# Patient Record
Sex: FEMALE | Race: WHITE | Employment: FULL TIME | URBAN - METROPOLITAN AREA
[De-identification: names, ages, dates, MRNs, and addresses within clinical notes are randomized per-mention and may not be internally consistent; named-entity substitution may affect disease eponyms.]

---

## 2020-12-15 ENCOUNTER — MEDICAL CORRESPONDENCE (OUTPATIENT)
Dept: HEALTH INFORMATION MANAGEMENT | Facility: CLINIC | Age: 60
End: 2020-12-15

## 2020-12-15 DIAGNOSIS — M54.12 CERVICAL RADICULOPATHY: Primary | ICD-10-CM

## 2020-12-17 ENCOUNTER — TELEPHONE (OUTPATIENT)
Dept: NEUROSURGERY | Facility: CLINIC | Age: 60
End: 2020-12-17

## 2020-12-17 NOTE — TELEPHONE ENCOUNTER
[facility] Call Center    Phone Message    May a detailed message be left on voicemail: yes     Reason for Call: Other: Pt calling to schedule appt with Dr. Martin for Cervical radiculopathy as referred by Lorrie Bergman at [place]. The referral indicates Pt is to be seen within 1-2 week. She advised that she has an EMG scheduled at [place] on 1/19/21 and her doctor thought she should have results from EMG prior to appt with Dr. Martin. Please review and call Pt back to advise ans schedule.    Action Taken: Message routed to:  [facility] ([facility] Neurosurgery    Travel Screening: Not Applicable

## 2020-12-21 NOTE — TELEPHONE ENCOUNTER
SPINE PATIENTS - NEW PROTOCOL PREVISIT    RECORDS RECEIVED FROM: External   Date of Appt: 1/21/21   NOTES (FOR ALL VISITS) STATUS DETAILS   OFFICE NOTE from referring provider Care Everywhere Dr Lorrie Bergman @ [facility]:  12/15/20   OFFICE NOTE from other specialist Care Everywhere Dr Austen Pollack @ [facility]:  10/29/20   DISCHARGE SUMMARY from hospital N/A    DISCHARGE REPORT from ER N/A    EMG REPORT [facility]:  1/19/21   MEDICATION LIST Care Everywhere    IMAGING  (FOR ALL VISITS)     MRI (HEAD, NECK, SPINE) Received [facility]:  MRI Cervical Spine 10/16/20   XRAY (SPINE) *NEUROSURGERY* Received [facility]:  XR Cervical Spine 10/29/20   CT (HEAD, NECK, SPINE) N/A       Action 12/21/20 MV 1.30pm   Action Taken Imaging request faxed to [facility] for:  MRI Cervical Spine 10/16/20  XR Cervical Spine 10/29/20     Imaging Received  1/8/21 MV 9.12am  [facility]   Image Type (x): Disc:   PACS: x   Exam Date/Name MRI Cervical Spine 10/16/20  XR Cervical Spine 10/29/20 Comments: images resolved in PACS

## 2021-01-05 ENCOUNTER — DOCUMENTATION ONLY (OUTPATIENT)
Dept: CARE COORDINATION | Facility: CLINIC | Age: 61
End: 2021-01-05

## 2021-01-21 ENCOUNTER — PRE VISIT (OUTPATIENT)
Dept: NEUROSURGERY | Facility: CLINIC | Age: 61
End: 2021-01-21

## 2021-01-21 ENCOUNTER — VIRTUAL VISIT (OUTPATIENT)
Dept: NEUROSURGERY | Facility: CLINIC | Age: 61
End: 2021-01-21
Payer: COMMERCIAL

## 2021-01-21 DIAGNOSIS — R42 DIZZINESS AFTER EXTENSION OF NECK: Primary | ICD-10-CM

## 2021-01-21 PROCEDURE — 99204 OFFICE O/P NEW MOD 45 MIN: CPT | Mod: 95 | Performed by: NEUROLOGICAL SURGERY

## 2021-01-21 RX ORDER — CHLORAL HYDRATE 500 MG
1000 CAPSULE ORAL
COMMUNITY

## 2021-01-21 RX ORDER — TIZANIDINE 2 MG/1
2 TABLET ORAL
COMMUNITY
Start: 2020-10-09 | End: 2021-10-14

## 2021-01-21 RX ORDER — ESTRADIOL 0.25 MG/.25G
GEL TOPICAL
COMMUNITY

## 2021-01-21 RX ORDER — GABAPENTIN 300 MG/1
300 CAPSULE ORAL
COMMUNITY
Start: 2020-10-29 | End: 2021-02-26

## 2021-01-21 NOTE — PATIENT INSTRUCTIONS
Dr. Martin placed a referral to see [facility] clinic regarding your symptoms for an evaluation.

## 2021-01-21 NOTE — LETTER
"1/21/2021       RE: Shobha Lino  [address] [address] [address]     Dear Colleague,    Thank you for referring your patient, Shobha Lino, to the [facility]OS[facility] [place] at [facility]. Please see a copy of my visit note below.    Shobha is a 60 year old who is being evaluated via a billable video visit converted to phone because patient could not see me on video    How would you like to obtain your AVS? mychart  If the video visit is dropped, the invitation should be resent by:   [email or web address]  Will anyone else be joining your video visit? no    Telephone visit 19 minutes duration    Provider Notes:  This is a video visit conducted due to systemwide and statewide restrictions on non-urgent clinic visits due to COVID-19 pandemic concerns. The patient did not identify any urgent or red-flag symptoms that would require in-person evaluation.          Neurosurgery Clinic Note    Chief Complaint: dizziness    History of Present Illness:  It was a pleasure to evaluate Shobha Lino in clinic today at the kind referral of Lorrie Bergman MD  [address] [place] [id number].      Shobha Lino is a 60 year old female presenting with dizziness with turning head, head and arm shaking 'I feel like I have Parkinsons';   Had an EMG 1/19/21 at [place] that is normal.  She has left arm feeling weak or numb at times but cannot identify exacerbating factors; chiropractic and massage have helped this feel better.  \"If I turn, I feel like it is getting pinched like if you hit your elbow\"; on left side of neck; patient has trouble localizing where this symptom is, I asked four different times to clarify. She doesn't have pain in her arm and can't localize paresthesia to her arm  She also feels like she has tingling in her neck when she lays down.    Allergies    Active Allergy Reactions Severity Noted Date Comments   Penicillin Hives (High) " High 01/18/2013     Sulfa Drugs Itching, Nausea and Vomiting, Rash   01/18/2013     Tobramycin-Dexamethasone Itching   01/04/2019 Itchy eyes       Medications    Medication Sig Dispensed Refills Start Date End Date Status   vitamin D3, cholecalciferol, 2000 unit tablet   Take 2,000 Units by mouth 1 time per day.   0     Active   Progesterone Micronized (PROGESTERONE PO)   Take 25 mg by mouth Take one capsule by mouth at bedtime    0     Active   Estradiol 0.25 MG/0.25GM GEL   Apply to the skin 2 times a day   0     Active   tiZANidine (ZANAFLEX) 2 mg tablet    Indications: Cervical radiculopathy at C8, DDD (degenerative disc disease), cervical Take 1 tablet (2 mg) by mouth 3 times a day as needed for muscle spasm 30 tablet   0 10/09/2020 10/14/2021 Active       Additional Information  Patient not taking. Reported on 12/15/2020 8:02 AM     omega-3 fatty acids (FISH OIL) 1000 mg capsule   Take 1,000 mg by mouth 1 time per day   0     Active   ibuprofen (ADVIL;MOTRIN-IB) 200 mg tablet   Take 200-600 mg by mouth 3 times a day as needed   0     Active   gabapentin (NEURONTIN) 300 mg capsule    Indications: Cervical spondylosis, Foraminal stenosis of cervical region Take 1 capsule (300 mg) by mouth 3 times a day Start with one capsule every night for 3 days then twice a day for 3 days then three times a day 90 capsule   3 10/29/2020 02/26/2021 Active   Ascorbic Acid (VITAMIN C) 500 MG CAPS   Take by mouth 1 time per day   0     Active   Biotin w/ Vitamins C & E (HAIR/SKIN/NAILS PO)   Take by mouth 1 time per day   0     Active     Active Problems  Problem Noted Date   Overweight (BMI 25.0-29.9) 02/04/2020   Last Assessment & Plan:     Patient is on weight watchers. Started Jan 2020. Discussed whole foods, avoidance of added sugar/processed foods. She is trying to stay active as well     Family history of breast cancer in first degree relative 02/04/2020   Overview:     Patient will check on genetic specifics with her  mother's recent breast cancer diagnosis.     Cervical radiculopathy at C8 02/19/2016   DDD (degenerative disc disease), cervical 02/11/2016   Overview:     Multiple levels       Last Assessment & Plan:     Last MRI in 2015 at [facility]     Trochanteric bursitis of right hip 10/13/2015   Family history of thyroid disease 10/14/2014   Last Assessment & Plan:     TSH today     Osteopenia 03/04/2013   Overview:     DEXA (12/11): Lumbar 0.5; right fem neck - 2.0; FRAX 6.8 & 0.9%       Last Assessment & Plan:     Will review Dexa from [facility] once available.   Continue with weight bearing exercise, calcium intake should be around 1200 mg via diet/supplementation daily, and vitamin D 2039-1861 [employer] UNITS daily       Symptomatic menopausal or female climacteric states 03/28/2012   Palpitations 11/17/2011   Overview:     Overview:   IMO Update 10/11       Last Assessment & Plan:     Notes palpitations are significantly improved on topical estrogen and oral progesterone. Had Holter study in 2016 with SR, ST. ECG normal. Stress Echo in 2013 normal. Avoids caffeine as this is a trigger. She notes this is mostly well controlled currently     Hemangioma of skin and subcutaneous tissue 08/31/2009   Family history of cardiovascular disease 08/25/2008       Surgical History    Surgery Date Site/Laterality Comments   UPPER ENDOSCOPY 6/25/2014   Procedure: UPPER ENDOSCOPY;; Surgeon: Sherry Oviedo MD     EYE PROCEDURE 03/03/2020 Left left uper lid chalazion removal Gabriel Honeycutt       Medical History    Medical History Date Comments   Osteopenia 2011     Normal cardiac stress test 2013 [facility]   Cervicalgia       Vitreous detachment of left eye       Benign neoplasm of skin of trunk, except scrotum 8/31/2009     Vitreous detachment of left eye 10/13/2015     Episodic lightheadedness 10/14/2014     Heart beat abnormality   occasional palpitations   Vertigo         Family History    Medical History Relation Name Comments   Dementia  Father   Lewy body dementia   Heart Attack Father   MI   Parkinsonism Father       Diabetes Maternal Aunt x1     Heart Disease Maternal Aunt x1 heart valve   Parkinsonism Maternal Aunt x1     Alcohol Abuse Maternal Grandfather    of liver issues   No Known Problems Maternal Grandmother       No Known Problems Maternal Uncle x4     Breast Cancer Mother   no genetic testing   Heart Attack Mother   Stent (one coronary a.)   Thyroid Mother   Thyroid problems otherwise healthy   Heart Attack Paternal Grandfather       No Known Problems Paternal Grandmother       No Known Problems Paternal Uncle x1     No Known Problems Sister       No Known Problems Son         Relation Name Status Comments   Father        Maternal Aunt x1 Alive     Maternal Grandfather         Maternal Grandmother         Maternal Uncle x4       Mother   Alive     Paternal Grandfather         Paternal Grandmother         Paternal Uncle x1 Alive     Sister   Alive     Son   Alive       Social History    Tobacco Use Types Packs/Day Years Used Date   Never Smoker           Smokeless Tobacco: Never Used           Alcohol Use Drinks/Week oz/Week Comments   Yes 1-2 Glasses of wine   1.0 - 2.0       Physical Activity Answer Date Recorded   On average, how many days per week do you engage in moderate to strenuous exercise (like walking fast, running, jogging, dancing, swimming, biking, or other activities that cause a light or heavy sweat)? 0 days 2020   On average, how many minutes do you engage in exercise at this level? 0 min 2020     Sexually Active [employer] Comments   Yes Post-menopausal Male       Sex Assigned at Birth Date Recorded   Not on file         IMAGING per my own measurement and interpretation:  MRI cervical spine: diffuse bilateral spondylosis with mild central stenosis  Standing xrays: normal alignment      Resulted Imaging/Labs:  Bone Density:  Mammogram - Him Scan    Result Date: 2020  Result  Narrative  Patient Name:   MARC INMAN  YOB: 1960  Procedure: MAMMOGRAM ASUNCION DIGITAL DIAGNOSTIC APURVA  Date of Service: 07/23/2020 -------- ADDENDUM #1 -------- This is an addended report to reflect that at the time of mammography there was no reported left breast pain. Note that a negative evaluation should not preclude or delay further workup of a clinically suspicious finding.  Further management of the patient's breast pain should be clinically directed. If there is recurrent left breast pain, an ultrasound can be performed as deemed clinically appropriate. Otherwise, the report remains unchanged. At my request, the above was communicated to the patient by the mammography technologist at the conclusion of evaluation. Finalized by: Carly Higgins MD on 7/23/2020 2:44 PM CDT -------- ORIGINAL REPORT -------- EXAM: MAMMOGRAM ASUNCION DIGITAL DIAGNOSTIC APURVA INDICATION: ICD-10 N64.4 Breast pain, left left breast tenderness . COMPARISON(S): Mammograms dating back to 2013. TECHNIQUE: Bilateral CC and MLO digital tomosynthesis was done in addition to routine imaging. Interpretation is made with the benefit of Computer Aided Detection. DENSITY: There are scattered areas of fibroglandular density in both breasts. FINDINGS: No suspicious masses, grouped calcifications or architectural distortions are identified in either breast.   IMPRESSION: No mammographic evidence of malignancy. MANAGEMENT: Continued screening mammography as indicated by ACR recommendations. ACR BI-RADS CATEGORY: 2 - Benign A letter will be sent to the patient indicating her mammography results in lay terminology. Finalized by: Carly Higgins MD on 7/23/2020 2:37 PM CDT  Patient/Procedure Information:  [facility]  MRN/MOLLY: [id number]  Order Number: [id number]  Accession Number: [id number]  Ordering Provider: BOUBACAR SHELDON  Authorizing Provider: BOUBACAR SHELDON Other Result Information Interface, [facility] -  07/23/2020  2:46 PM CDT  Patient Name:   MARC INMAN  YOB: 1960  Procedure: MAMMOGRAM ASUNCION DIGITAL DIAGNOSTIC APURVA  Date of Service: 07/23/2020 -------- ADDENDUM #1 -------- This is an addended report to reflect that at the time of mammography there was no reported left breast pain. Note that a negative evaluation should not preclude or delay further workup of a clinically suspicious finding.  Further management of the patient's breast pain should be clinically directed. If there is recurrent left breast pain, an ultrasound can be performed as deemed clinically appropriate. Otherwise, the report remains unchanged. At my request, the above was communicated to the patient by the mammography technologist at the conclusion of evaluation. Finalized by: Carly Higgins MD on 7/23/2020 2:44 PM CDT -------- ORIGINAL REPORT -------- EXAM: MAMMOGRAM ASUNCION DIGITAL DIAGNOSTIC APURVA INDICATION: ICD-10 N64.4 Breast pain, left left breast tenderness . COMPARISON(S): Mammograms dating back to 2013. TECHNIQUE: Bilateral CC and MLO digital tomosynthesis was done in addition to routine imaging. Interpretation is made with the benefit of Computer Aided Detection. DENSITY: There are scattered areas of fibroglandular density in both breasts. FINDINGS: No suspicious masses, grouped calcifications or architectural distortions are identified in either breast.  IMPRESSION: No mammographic evidence of malignancy. MANAGEMENT: Continued screening mammography as indicated by ACR recommendations. ACR BI-RADS CATEGORY: 2 - Benign A letter will be sent to the patient indicating her mammography results in lay terminology. Finalized by: Carly Higgins MD on 7/23/2020 2:37 PM CDT  Patient/Procedure Information:  [facility]  MRN/MOLLY: [id number]  Order Number: [id number]  Accession Number: [id number]  Ordering Provider: BOUBACAR SHELDON  Authorizing Provider: BOUBACAR SHELDON       Vitamin D:  Vitamin D Deficiency  Screening Results:  No results found for: VITDT  No results found for: GJA297, LVHJ274, [id number], VITD3, D2VIT, D3VIT, DTOT, [id number], [id number], [id number], [id number], [id number], [id number]      Nutritional Status:  There is no height or weight on file to calculate BMI.    No results found for: ALBUMIN    Diabetes Screening:  No results found for: A1C    Nicotine Usage:    No     exam none due to phone    ASSESSMENT:  Shobha Lino is a 60 year old female with cervical spondylosis, dizziness, tremor, unclear if any of her symptoms are related to cervical spondylosis    PLAN:    I do not have an explanation for the patient's symptoms of tremors and shaking, or positional dizziness, and I cannot improve these with spine surgery.  I cannot by history find any clear radicular pattern of symptoms or any myelopathy pattern, therefore I don't recommend spine surgery at this time and I do recommend an evaluation with our Neurology team here at the [facility] for their opinion on multiple diffuse symptoms she is having.   I have placed this Neurology referral. If they think there is a spinal etiology, I would be happy to see Shobha in clinic to discuss further, but I do not have indication to offer spine surgery at this time.    Mercedes Martin MD    [facility] Department of Neurosurgery  Complex Spinal Deformity, Scoliosis, and Minimally Invasive Spine Surgery Specialist  Office: [phone]

## 2021-01-21 NOTE — PROGRESS NOTES
"Shobha is a 60 year old who is being evaluated via a billable video visit converted to phone because patient could not see me on video    How would you like to obtain your AVS? mychart  If the video visit is dropped, the invitation should be resent by:   [email or web address]  Will anyone else be joining your video visit? no      Telephone visit 19 minutes duration    Provider Notes:  This is a video visit conducted due to systemwide and statewide restrictions on non-urgent clinic visits due to COVID-19 pandemic concerns. The patient did not identify any urgent or red-flag symptoms that would require in-person evaluation.        Neurosurgery Clinic Note    Chief Complaint: dizziness    History of Present Illness:  It was a pleasure to evaluate Shobha Lino in clinic today at the kind referral of Lorrie Bergman MD  [address] [place] [id number].      Shobha Lino is a 60 year old female presenting with dizziness with turning head, head and arm shaking 'I feel like I have Parkinsons';   Had an EMG 1/19/21 at [place] that is normal.  She has left arm feeling weak or numb at times but cannot identify exacerbating factors; chiropractic and massage have helped this feel better.  \"If I turn, I feel like it is getting pinched like if you hit your elbow\"; on left side of neck; patient has trouble localizing where this symptom is, I asked four different times to clarify. She doesn't have pain in her arm and can't localize paresthesia to her arm  She also feels like she has tingling in her neck when she lays down.    Allergies    Active Allergy Reactions Severity Noted Date Comments   Penicillin Hives (High) High 01/18/2013     Sulfa Drugs Itching, Nausea and Vomiting, Rash   01/18/2013     Tobramycin-Dexamethasone Itching   01/04/2019 Itchy eyes       Medications    Medication Sig Dispensed Refills Start Date End Date Status   vitamin D3, cholecalciferol, 2000 unit tablet   Take 2,000 Units by mouth 1 time per " day.   0     Active   Progesterone Micronized (PROGESTERONE PO)   Take 25 mg by mouth Take one capsule by mouth at bedtime    0     Active   Estradiol 0.25 MG/0.25GM GEL   Apply to the skin 2 times a day   0     Active   tiZANidine (ZANAFLEX) 2 mg tablet    Indications: Cervical radiculopathy at C8, DDD (degenerative disc disease), cervical Take 1 tablet (2 mg) by mouth 3 times a day as needed for muscle spasm 30 tablet   0 10/09/2020 10/14/2021 Active       Additional Information  Patient not taking. Reported on 12/15/2020 8:02 AM     omega-3 fatty acids (FISH OIL) 1000 mg capsule   Take 1,000 mg by mouth 1 time per day   0     Active   ibuprofen (ADVIL;MOTRIN-IB) 200 mg tablet   Take 200-600 mg by mouth 3 times a day as needed   0     Active   gabapentin (NEURONTIN) 300 mg capsule    Indications: Cervical spondylosis, Foraminal stenosis of cervical region Take 1 capsule (300 mg) by mouth 3 times a day Start with one capsule every night for 3 days then twice a day for 3 days then three times a day 90 capsule   3 10/29/2020 02/26/2021 Active   Ascorbic Acid (VITAMIN C) 500 MG CAPS   Take by mouth 1 time per day   0     Active   Biotin w/ Vitamins C & E (HAIR/SKIN/NAILS PO)   Take by mouth 1 time per day   0     Active     Active Problems  Problem Noted Date   Overweight (BMI 25.0-29.9) 02/04/2020   Last Assessment & Plan:     Patient is on weight watchers. Started Jan 2020. Discussed whole foods, avoidance of added sugar/processed foods. She is trying to stay active as well     Family history of breast cancer in first degree relative 02/04/2020   Overview:     Patient will check on genetic specifics with her mother's recent breast cancer diagnosis.     Cervical radiculopathy at C8 02/19/2016   DDD (degenerative disc disease), cervical 02/11/2016   Overview:     Multiple levels       Last Assessment & Plan:     Last MRI in 2015 at [place]     Trochanteric bursitis of right hip 10/13/2015   Family history of thyroid  disease 10/14/2014   Last Assessment & Plan:     TSH today     Osteopenia 2013   Overview:     DEXA (): Lumbar 0.5; right fem neck - 2.0; FRAX 6.8 & 0.9%       Last Assessment & Plan:     Will review Dexa from [facility] once available.   Continue with weight bearing exercise, calcium intake should be around 1200 mg via diet/supplementation daily, and vitamin D 4330-7396 INTERNATIONAL UNITS daily       Symptomatic menopausal or female climacteric states 2012   Palpitations 2011   Overview:     Overview:   IMO Update 10/11       Last Assessment & Plan:     Notes palpitations are significantly improved on topical estrogen and oral progesterone. Had Holter study in 2016 with SR, ST. ECG normal. Stress Echo in  normal. Avoids caffeine as this is a trigger. She notes this is mostly well controlled currently     Hemangioma of skin and subcutaneous tissue 2009   Family history of cardiovascular disease 2008       Surgical History    Surgery Date Site/Laterality Comments   UPPER ENDOSCOPY 2014   Procedure: UPPER ENDOSCOPY;; Surgeon: Sherry Oviedo MD     EYE PROCEDURE 2020 Left left uper lid chalazion removal Gabriel Yinka       Medical History    Medical History Date Comments   Osteopenia      Normal cardiac stress test  DSE   Cervicalgia       Vitreous detachment of left eye       Benign neoplasm of skin of trunk, except scrotum 2009     Vitreous detachment of left eye 10/13/2015     Episodic lightheadedness 10/14/2014     Heart beat abnormality   occasional palpitations   Vertigo         Family History    Medical History Relation Name Comments   Dementia Father   Lewy body dementia   Heart Attack Father   MI   Parkinsonism Father       Diabetes Maternal Aunt x1     Heart Disease Maternal Aunt x1 heart valve   Parkinsonism Maternal Aunt x1     Alcohol Abuse Maternal Grandfather    of liver issues   No Known Problems Maternal Grandmother       No Known  Problems Maternal Uncle x4     Breast Cancer Mother   no genetic testing   Heart Attack Mother   Stent (one coronary a.)   Thyroid Mother   Thyroid problems otherwise healthy   Heart Attack Paternal Grandfather       No Known Problems Paternal Grandmother       No Known Problems Paternal Uncle x1     No Known Problems Sister       No Known Problems Son         Relation Name Status Comments   Father        Maternal Aunt x1 Alive     Maternal Grandfather         Maternal Grandmother         Maternal Uncle x4       Mother   Alive     Paternal Grandfather         Paternal Grandmother         Paternal Uncle x1 Alive     Sister   Alive     Son   Alive       Social History    Tobacco Use Types Packs/Day Years Used Date   Never Smoker           Smokeless Tobacco: Never Used           Alcohol Use Drinks/Week oz/Week Comments   Yes 1-2 Glasses of wine   1.0 - 2.0       Physical Activity Answer Date Recorded   On average, how many days per week do you engage in moderate to strenuous exercise (like walking fast, running, jogging, dancing, swimming, biking, or other activities that cause a light or heavy sweat)? 0 days 2020   On average, how many minutes do you engage in exercise at this level? 0 min 2020     Sexually Active Birth [employer] Comments   Yes Post-menopausal Male       Sex Assigned at Birth Date Recorded   Not on file             IMAGING per my own measurement and interpretation:  MRI cervical spine: diffuse bilateral spondylosis with mild central stenosis  Standing xrays: normal alignment      Resulted Imaging/Labs:  Bone Density:  Mammogram - Him Scan    Result Date: 2020  Result Narrative  Patient Name:   MARC INMAN  YOB: 1960  Procedure: MAMMOGRAM ASUNCION DIGITAL DIAGNOSTIC APURVA  Date of Service: 2020 -------- ADDENDUM #1 -------- This is an addended report to reflect that at the time of mammography there was no reported left breast pain. Note that a  negative evaluation should not preclude or delay further workup of a clinically suspicious finding.  Further management of the patient's breast pain should be clinically directed. If there is recurrent left breast pain, an ultrasound can be performed as deemed clinically appropriate. Otherwise, the report remains unchanged. At my request, the above was communicated to the patient by the mammography technologist at the conclusion of evaluation. Finalized by: Carly Higgins MD on 7/23/2020 2:44 PM CDT -------- ORIGINAL REPORT -------- EXAM: MAMMOGRAM ASUNCION DIGITAL DIAGNOSTIC APURVA INDICATION: ICD-10 N64.4 Breast pain, left left breast tenderness . COMPARISON(S): Mammograms dating back to 2013. TECHNIQUE: Bilateral CC and MLO digital tomosynthesis was done in addition to routine imaging. Interpretation is made with the benefit of Computer Aided Detection. DENSITY: There are scattered areas of fibroglandular density in both breasts. FINDINGS: No suspicious masses, grouped calcifications or architectural distortions are identified in either breast.   IMPRESSION: No mammographic evidence of malignancy. MANAGEMENT: Continued screening mammography as indicated by ACR recommendations. ACR BI-RADS CATEGORY: 2 - Benign A letter will be sent to the patient indicating her mammography results in lay terminology. Finalized by: Carly Higgins MD on 7/23/2020 2:37 PM CDT  Patient/Procedure Information:  [facility]  MRN/MOLLY: [id number]  Order Number: [id number]  Accession Number: [id number]  Ordering Provider: BOUBACAR SHELDON  Authorizing Provider: BOUBACAR SHELDON Other Result Information [facility] - 07/23/2020  2:46 PM CDT  Patient Name:   MARC INMAN  YOB: 1960  Procedure: MAMMOGRAM ASUNCION DIGITAL DIAGNOSTIC APURVA  Date of Service: 07/23/2020 -------- ADDENDUM #1 -------- This is an addended report to reflect that at the time of mammography there was no reported left breast pain.  Note that a negative evaluation should not preclude or delay further workup of a clinically suspicious finding.  Further management of the patient's breast pain should be clinically directed. If there is recurrent left breast pain, an ultrasound can be performed as deemed clinically appropriate. Otherwise, the report remains unchanged. At my request, the above was communicated to the patient by the mammography technologist at the conclusion of evaluation. Finalized by: Carly Higgins MD on 7/23/2020 2:44 PM CDT -------- ORIGINAL REPORT -------- EXAM: MAMMOGRAM ASUNCION DIGITAL DIAGNOSTIC APURVA INDICATION: ICD-10 N64.4 Breast pain, left left breast tenderness . COMPARISON(S): Mammograms dating back to 2013. TECHNIQUE: Bilateral CC and MLO digital tomosynthesis was done in addition to routine imaging. Interpretation is made with the benefit of Computer Aided Detection. DENSITY: There are scattered areas of fibroglandular density in both breasts. FINDINGS: No suspicious masses, grouped calcifications or architectural distortions are identified in either breast.  IMPRESSION: No mammographic evidence of malignancy. MANAGEMENT: Continued screening mammography as indicated by ACR recommendations. ACR BI-RADS CATEGORY: 2 - Benign A letter will be sent to the patient indicating her mammography results in lay terminology. Finalized by: Carly Higgins MD on 7/23/2020 2:37 PM CDT  Patient/Procedure Information:  [facility]  MRN/MOLLY: [id number]  Order Number: [id number]  Accession Number: [id number]  Ordering Provider: BOUBACAR SHELDON  Authorizing Provider: BOUBACAR SHELDON       Vitamin D:  Vitamin D Deficiency Screening Results:  No results found for: VITDT  No results found for: OPG185, ZBFD471, [id number], VITD3, D2VIT, D3VIT, DTOT, [id number], [id number], [id number], [id number], [id number], [id number]      Nutritional Status:  There is no height or weight on file to calculate BMI.    No results found for:  ALBUMIN    Diabetes Screening:  No results found for: A1C    Nicotine Usage:    No           exam none due to phone        ASSESSMENT:  Shobha Lino is a 60 year old female with cervical spondylosis, dizziness, tremor, unclear if any of her symptoms are related to cervical spondylosis    PLAN:    I do not have an explanation for the patient's symptoms of tremors and shaking, or positional dizziness, and I cannot improve these with spine surgery.  I cannot by history find any clear radicular pattern of symptoms or any myelopathy pattern, therefore I don't recommend spine surgery at this time and I do recommend an evaluation with our Neurology team here at the [facility] for their opinion on multiple diffuse symptoms she is having.   I have placed this Neurology referral. If they think there is a spinal etiology, I would be happy to see Shobha in clinic to discuss further, but I do not have indication to offer spine surgery at this time.            Mercedes Martin MD    [facility] Department of Neurosurgery  Complex Spinal Deformity, Scoliosis, and Minimally Invasive Spine Surgery Specialist  Office: [phone]        1/21/2021

## 2021-01-24 ENCOUNTER — HEALTH MAINTENANCE LETTER (OUTPATIENT)
Age: 61
End: 2021-01-24

## 2021-03-02 ENCOUNTER — PRE VISIT (OUTPATIENT)
Dept: NEUROLOGY | Facility: CLINIC | Age: 61
End: 2021-03-02

## 2021-03-02 NOTE — TELEPHONE ENCOUNTER
FUTURE VISIT INFORMATION      FUTURE VISIT INFORMATION:    Date: 3/5/2021    Time: 145pm    Location: [facility]  REFERRAL INFORMATION:    Referring provider:  Dr. Martin     Referring providers clinic:  [facility] Neurosurgery     Reason for visit/diagnosis  Dizziness     RECORDS REQUESTED FROM:       Clinic name Comments Records Status Imaging Status   Internal Dr. Martin-1/21/2021 Epic N/A          [facility] Dr. Bergman-12/15/2020    CTA Brain-1/15/2016    MR Cervical Spine 10/17/2020 Care Everywhere Requested to PACS                       3/2/2021-Spoke with [facility] to have images pushed-MR @ 833am    3/4/2021-[facility] now in PACS-MR @ 532am

## 2021-03-05 ENCOUNTER — OFFICE VISIT (OUTPATIENT)
Dept: NEUROLOGY | Facility: CLINIC | Age: 61
End: 2021-03-05
Attending: NEUROLOGICAL SURGERY
Payer: COMMERCIAL

## 2021-03-05 VITALS
HEART RATE: 110 BPM | WEIGHT: 161 LBS | DIASTOLIC BLOOD PRESSURE: 92 MMHG | OXYGEN SATURATION: 95 % | SYSTOLIC BLOOD PRESSURE: 137 MMHG | RESPIRATION RATE: 16 BRPM

## 2021-03-05 DIAGNOSIS — R42 EPISODE OF DIZZINESS: Primary | ICD-10-CM

## 2021-03-05 PROCEDURE — 99205 OFFICE O/P NEW HI 60 MIN: CPT | Performed by: INTERNAL MEDICINE

## 2021-03-05 RX ORDER — GABAPENTIN 300 MG/1
300 CAPSULE ORAL 2 TIMES DAILY
COMMUNITY

## 2021-03-05 RX ORDER — PROGESTERONE, MICRONIZED 100 %
POWDER (GRAM) MISCELLANEOUS
COMMUNITY
Start: 2021-03-01 | End: 2021-03-05

## 2021-03-05 ASSESSMENT — PAIN SCALES - GENERAL: PAINLEVEL: MODERATE PAIN (5)

## 2021-03-05 NOTE — PROGRESS NOTES
"[facility] Neurology Consultation    Shobha Lino MRN# [id number]   Age: 60 year old YOB: 1960     Requesting physician: Grecia Morales     Reason for Consultation: dizziness with head movements      History of Presenting Symptoms:   Shobha Lino is a 60 year old female who presents today for evaluation of dizziness with head movements.     For approximately the last 5 years patient has experienced symptoms of dizziness with head movements. Patient has difficulties describing precisely what she means by dizziness. Symptoms are provoked by moving her head from side to side or up or down. She will a sensation of a pinch/shock like \"someone hitting her funny bone\" throughout her whole head and neck, but more on the left. She will feel a slight sensation of swaying and lightheadendess, but denies any vertiginous quality or feeling that she might pass out. She will feel a fullness and numbness sensation in the neck, more on the left side. She might notice occasionaly ringing in the ears. When she gets the symptoms she will re center and start to feel some what better within the next few seconds to minute, but often times she has lingering symptoms for 30 minutes. Symptoms are occurring at minimum daily. Patient feels like there is a pinched nerve in the neck causing the symptoms.     In 2016 patient had CTA H/N which was normal. [facility]. She had physical therapy, but didn't notice an improvement in symptoms. She states she had her hearing checked there. She thinks they did another test checking the coordination of her eye movement. Patient has also been a chiropractor and gets massages. She does find some improvement in symptoms with massage. Her neck feels tight. She denies any frequent pain that starts in the neck and shoots down the arms. Patient recently had a bilateral upper extremity EMG which was normal. MRI cervical spine showed multiple " "levels of foraminal stenosis. She was recently referred to neurosurgery for opinion on whether surgery needed for symptoms.     Patient reports remote history of vertigo with head turning due to abnormal position of \"crystals\". She reports that current symptoms are much different than this.       Past Medical History:   There is no problem list on file for this patient.    No past medical history on file.     Past Surgical History:   No past surgical history on file.     Social History:     Social History     Tobacco Use     Smoking status: Not on file   Substance Use Topics     Alcohol use: Not on file     Drug use: Not on file        Family History:   No family history on file.     Medications:     Current Outpatient Medications   Medication Sig     ascorbic acid (VITAMIN C) 500 MG CPCR CR capsule      cholecalciferol 50 MCG (2000 UT) tablet Take 2,000 Units by mouth     Estradiol 0.25 MG/0.25GM GEL      fish oil-omega-3 fatty acids 1000 MG capsule Take 1,000 mg by mouth     gabapentin (NEURONTIN) 300 MG capsule Take 300 mg by mouth     tiZANidine (ZANAFLEX) 2 MG tablet Take 2 mg by mouth     No current facility-administered medications for this visit.         Allergies:     Allergies   Allergen Reactions     Penicillins Hives and Rash     Sulfa Drugs Itching, Nausea and Vomiting and Rash        Review of Systems:   As above     Physical Exam:   Vitals: BP (!) 137/92   Pulse 110   Resp 16   Wt 73 kg (161 lb)   SpO2 95%    General: Seated comfortably in no acute distress.  HEENT: Neck supple with mildly decreased range of motion. Mild paracervical muscle tenderness and tightness.  Optic discs sharp and vasculature normal on funduscopic exam.   Heart: Regular rate  Lungs: breathing comfortably  Extremities: no edema  Skin: No rashes  Neurologic:     Mental Status: Fully alert, attentive and oriented. Normal memory and fund of knowledge. Language normal, speech clear and fluent, no paraphasic errors.     Cranial " Nerves: Visual fields intact. PERRL. EOMI with normal smooth pursuit. Facial sensation intact/symmetric. Facial movements symmetric. Hearing not formally tested but intact to conversation. Palate elevation symmetric, uvula midline. No dysarthria. Shoulder shrug strong bilaterally. Tongue protrusion midline.     Motor: No tremors or other abnormal movements observed. Muscle tone normal throughout. No pronator drift. Normal/symmetric rapid finger tapping. Strength 5/5 throughout upper and lower extremities.     Deep Tendon Reflexes: 2+/symmetric throughout upper and lower extremities. Toes downgoing bilaterally.     Sensory: Intact/symmetric to light touch, temperature, vibration throughout upper and lower extremities. Negative Romberg.      Coordination: Finger-nose-finger and heel-shin intact without dysmetria. Rapid alternating movements intact/symmetric with normal speed and rhythm.     Gait: Normal, steady casual gait. Able to walk on toes, heels and tandem without difficulty.  DixHallpike test negative  HINTs exam negative         Data: Pertinent prior to visit   Imaging:  MRI cervical spine 10/17/2020  IMPRESSION:  Multilevel degenerative disease producing only minimal central canal stenosis. Alignment remains anatomic.  Multiple levels of neural foraminal narrowing; moderate bilateral at C3-4, severe bilateral at C4-5, severe left and moderate right at C5-6 as well as severe bilateral at C6-7.    CTA H/N 1/2016  IMPRESSION:  1. Negative CT Angiogram of the neck.  2. Negative CT Angiogram of the brain.  3. Mild mucosal thickening of the paranasal sinuses.  4. Subtle white matter low attenuation along the periventricular white   matter, best seen on non-contrast images. This is non-specific for the   patient's age, but most likely reflects the sequela of chronic small   vessel ischemic disease.     Procedures:  EMG bilateral upper extremities 1/19/2021  Impression:  This is a normal study.   There is no  electrophysiological evidence for radiculopathy, myopathy or nerve entrapment in either arm.    Laboratory:  None         Assessment and Plan:   Assessment:  Shobha Lino is a 60 year old female who presents today for evaluation of 5 year history of dizziness with head movements. Dizziness is provoked by turning her head and is described as shock-like sensation in her head/neck, lightheadedness, swaying, and feeling of fullness/numbness in the neck. 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 She has gone to the [facility] and done physical therapy without improvement in her symptoms. Patient reports remote history of symptoms characteristic of BPPV different from current symptoms. Unclear etiology of symptoms at this point. Although rare, given strong correlation with head turning could consider neurovascular compression as could be seen in eagle's syndrome. We discussed pursuing CTV head and neck to look for evidence of internal jugular stenosis and prolonged styloid process.      Plan:  - CTV head and neck with contrast    Follow up in Neurology clinic pending above    Moiz De Los Santos MD   of Neurology  [facility]      The total time of this encounter amounted to 80 minutes. This time included time spent with the patient, prep work, ordering tests, and performing post visit documentation.

## 2021-03-05 NOTE — LETTER
"3/5/2021       RE: Shobha Lino  [id number] [facility] [address]     Dear Colleague,    Thank you for referring your patient, Shobha Lino, to the [facility]. Please see a copy of my visit note below.    [facility] Neurology Consultation    Shobha Lino MRN# [id number]   Age: 60 year old YOB: 1960     Requesting physician: Grecia Morales     Reason for Consultation: dizziness with head movements      History of Presenting Symptoms:   Shobha Lino is a 60 year old female who presents today for evaluation of dizziness with head movements.     For approximately the last 5 years patient has experienced symptoms of dizziness with head movements. Patient has difficulties describing precisely what she means by dizziness. Symptoms are provoked by moving her head from side to side or up or down. She will a sensation of a pinch/shock like \"someone hitting her funny bone\" throughout her whole head and neck, but more on the left. She will feel a slight sensation of swaying and lightheadendess, but denies any vertiginous quality or feeling that she might pass out. She will feel a fullness and numbness sensation in the neck, more on the left side. She might notice occasionaly ringing in the ears. When she gets the symptoms she will re center and start to feel some what better within the next few seconds to minute, but often times she has lingering symptoms for 30 minutes. Symptoms are occurring at minimum daily. Patient feels like there is a pinched nerve in the neck causing the symptoms.     In 2016 patient had CTA H/N which was normal. [facility]. She had physical therapy, but didn't notice an improvement in symptoms. She states she had her hearing checked there. She thinks they did another test checking the coordination of her eye movement. Patient " "has also been a chiropractor and gets massages. She does find some improvement in symptoms with massage. Her neck feels tight. She denies any frequent pain that starts in the neck and shoots down the arms. Patient recently had a bilateral upper extremity EMG which was normal. MRI cervical spine showed multiple levels of foraminal stenosis. She was recently referred to neurosurgery for opinion on whether surgery needed for symptoms.     Patient reports remote history of vertigo with head turning due to abnormal position of \"crystals\". She reports that current symptoms are much different than this.       Past Medical History:   There is no problem list on file for this patient.    No past medical history on file.     Past Surgical History:   No past surgical history on file.     Social History:     Social History     Tobacco Use     Smoking status: Not on file   Substance Use Topics     Alcohol use: Not on file     Drug use: Not on file        Family History:   No family history on file.     Medications:     Current Outpatient Medications   Medication Sig     ascorbic acid (VITAMIN C) 500 MG CPCR CR capsule      cholecalciferol 50 MCG (2000 UT) tablet Take 2,000 Units by mouth     Estradiol 0.25 MG/0.25GM GEL      fish oil-omega-3 fatty acids 1000 MG capsule Take 1,000 mg by mouth     gabapentin (NEURONTIN) 300 MG capsule Take 300 mg by mouth     tiZANidine (ZANAFLEX) 2 MG tablet Take 2 mg by mouth     No current facility-administered medications for this visit.         Allergies:     Allergies   Allergen Reactions     Penicillins Hives and Rash     Sulfa Drugs Itching, Nausea and Vomiting and Rash        Review of Systems:   As above     Physical Exam:   Vitals: BP (!) 137/92   Pulse 110   Resp 16   Wt 73 kg (161 lb)   SpO2 95%    General: Seated comfortably in no acute distress.  HEENT: Neck supple with mildly decreased range of motion. Mild paracervical muscle tenderness and tightness.  Optic discs sharp and " vasculature normal on funduscopic exam.   Heart: Regular rate  Lungs: breathing comfortably  Extremities: no edema  Skin: No rashes  Neurologic:     Mental Status: Fully alert, attentive and oriented. Normal memory and fund of knowledge. Language normal, speech clear and fluent, no paraphasic errors.     Cranial Nerves: Visual fields intact. PERRL. EOMI with normal smooth pursuit. Facial sensation intact/symmetric. Facial movements symmetric. Hearing not formally tested but intact to conversation. Palate elevation symmetric, uvula midline. No dysarthria. Shoulder shrug strong bilaterally. Tongue protrusion midline.     Motor: No tremors or other abnormal movements observed. Muscle tone normal throughout. No pronator drift. Normal/symmetric rapid finger tapping. Strength 5/5 throughout upper and lower extremities.     Deep Tendon Reflexes: 2+/symmetric throughout upper and lower extremities. Toes downgoing bilaterally.     Sensory: Intact/symmetric to light touch, temperature, vibration throughout upper and lower extremities. Negative Romberg.      Coordination: Finger-nose-finger and heel-shin intact without dysmetria. Rapid alternating movements intact/symmetric with normal speed and rhythm.     Gait: Normal, steady casual gait. Able to walk on toes, heels and tandem without difficulty.  DixHallpike test negative  HINTs exam negative         Data: Pertinent prior to visit   Imaging:  MRI cervical spine 10/17/2020  IMPRESSION:  Multilevel degenerative disease producing only minimal central canal stenosis. Alignment remains anatomic.  Multiple levels of neural foraminal narrowing; moderate bilateral at C3-4, severe bilateral at C4-5, severe left and moderate right at C5-6 as well as severe bilateral at C6-7.    CTA H/N 1/2016  IMPRESSION:  1. Negative CT Angiogram of the neck.  2. Negative CT Angiogram of the brain.  3. Mild mucosal thickening of the paranasal sinuses.  4. Subtle white matter low attenuation along  the periventricular white   matter, best seen on non-contrast images. This is non-specific for the   patient's age, but most likely reflects the sequela of chronic small   vessel ischemic disease.     Procedures:  EMG bilateral upper extremities 1/19/2021  Impression:  This is a normal study.   There is no electrophysiological evidence for radiculopathy, myopathy or nerve entrapment in either arm.    Laboratory:  None         Assessment and Plan:   Assessment:  Shobha Lino is a 60 year old female who presents today for evaluation of 5 year history of dizziness with head movements. Dizziness is provoked by turning her head and is described as shock-like sensation in her head/neck, lightheadedness, swaying, and feeling of fullness/numbness in the neck. 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 She has gone to the [facility] and done physical therapy without improvement in her symptoms. Patient reports remote history of symptoms characteristic of BPPV different from current symptoms. Unclear etiology of symptoms at this point. Although rare, given strong correlation with head turning could consider neurovascular compression as could be seen in eagle's syndrome. We discussed pursuing CTV head and neck to look for evidence of internal jugular stenosis and prolonged styloid process.      Plan:  - CTV head and neck with contrast    Follow up in Neurology clinic pending above    Moiz De Los Santos MD   of Neurology  [facility]      The total time of this encounter amounted to 80 minutes. This time included time spent with the patient, prep work, ordering tests, and performing post visit documentation.

## 2021-03-05 NOTE — NURSING NOTE
Chief Complaint   Patient presents with     Consult     [facility] NEW - dizziness      Emiliano Bachan

## 2021-03-08 ENCOUNTER — TELEPHONE (OUTPATIENT)
Dept: NEUROLOGY | Facility: CLINIC | Age: 61
End: 2021-03-08

## 2021-03-08 NOTE — TELEPHONE ENCOUNTER
----- Message from Moiz De Los Santos MD sent at 3/5/2021  4:31 PM CST -----  Regarding: Fax CT venogram order to patient's PCP in [place]  Lennox Bowden or Deepa,     Can you fax CT venogram head and neck order to patient's PCP with [facility] in [place] (Grecia Handley)? She is going to get study closer to home in [place].     Thanks for your help.     Moiz

## 2021-09-05 ENCOUNTER — HEALTH MAINTENANCE LETTER (OUTPATIENT)
Age: 61
End: 2021-09-05

## 2022-02-20 ENCOUNTER — HEALTH MAINTENANCE LETTER (OUTPATIENT)
Age: 62
End: 2022-02-20

## 2022-10-23 ENCOUNTER — HEALTH MAINTENANCE LETTER (OUTPATIENT)
Age: 62
End: 2022-10-23

## 2023-11-05 ENCOUNTER — HEALTH MAINTENANCE LETTER (OUTPATIENT)
Age: 63
End: 2023-11-05